# Patient Record
Sex: FEMALE | Race: WHITE | NOT HISPANIC OR LATINO | URBAN - METROPOLITAN AREA
[De-identification: names, ages, dates, MRNs, and addresses within clinical notes are randomized per-mention and may not be internally consistent; named-entity substitution may affect disease eponyms.]

---

## 2017-07-13 ENCOUNTER — ON CAMPUS - OUTPATIENT (AMBULATORY)
Dept: URBAN - METROPOLITAN AREA HOSPITAL 2 | Facility: HOSPITAL | Age: 77
End: 2017-07-13
Payer: COMMERCIAL

## 2017-07-13 ENCOUNTER — OFFICE (AMBULATORY)
Dept: URBAN - METROPOLITAN AREA CLINIC 64 | Facility: CLINIC | Age: 77
End: 2017-07-13

## 2017-07-13 VITALS
SYSTOLIC BLOOD PRESSURE: 132 MMHG | DIASTOLIC BLOOD PRESSURE: 49 MMHG | HEART RATE: 65 BPM | SYSTOLIC BLOOD PRESSURE: 118 MMHG | SYSTOLIC BLOOD PRESSURE: 137 MMHG | OXYGEN SATURATION: 97 % | DIASTOLIC BLOOD PRESSURE: 76 MMHG | HEIGHT: 63 IN | HEART RATE: 57 BPM | SYSTOLIC BLOOD PRESSURE: 128 MMHG | SYSTOLIC BLOOD PRESSURE: 144 MMHG | DIASTOLIC BLOOD PRESSURE: 65 MMHG | SYSTOLIC BLOOD PRESSURE: 123 MMHG | OXYGEN SATURATION: 99 % | DIASTOLIC BLOOD PRESSURE: 63 MMHG | RESPIRATION RATE: 16 BRPM | HEART RATE: 61 BPM | DIASTOLIC BLOOD PRESSURE: 60 MMHG | TEMPERATURE: 98.1 F | HEART RATE: 63 BPM | HEART RATE: 59 BPM | DIASTOLIC BLOOD PRESSURE: 69 MMHG | HEART RATE: 64 BPM | SYSTOLIC BLOOD PRESSURE: 116 MMHG | HEART RATE: 67 BPM | DIASTOLIC BLOOD PRESSURE: 70 MMHG | HEART RATE: 60 BPM | RESPIRATION RATE: 18 BRPM | DIASTOLIC BLOOD PRESSURE: 71 MMHG | OXYGEN SATURATION: 95 % | DIASTOLIC BLOOD PRESSURE: 74 MMHG | HEART RATE: 62 BPM | OXYGEN SATURATION: 98 % | WEIGHT: 170 LBS | SYSTOLIC BLOOD PRESSURE: 117 MMHG

## 2017-07-13 DIAGNOSIS — D12.2 BENIGN NEOPLASM OF ASCENDING COLON: ICD-10-CM

## 2017-07-13 DIAGNOSIS — K57.30 DIVERTICULOSIS OF LARGE INTESTINE WITHOUT PERFORATION OR ABS: ICD-10-CM

## 2017-07-13 DIAGNOSIS — Z86.010 PERSONAL HISTORY OF COLONIC POLYPS: ICD-10-CM

## 2017-07-13 LAB
GI HISTOLOGY: A. UNSPECIFIED: (no result)
GI HISTOLOGY: PDF REPORT: (no result)

## 2017-07-13 PROCEDURE — 88305 TISSUE EXAM BY PATHOLOGIST: CPT | Mod: 26 | Performed by: INTERNAL MEDICINE

## 2017-07-13 PROCEDURE — 45385 COLONOSCOPY W/LESION REMOVAL: CPT | Mod: PT | Performed by: INTERNAL MEDICINE

## 2017-07-13 RX ADMIN — PROPOFOL: 10 INJECTION, EMULSION INTRAVENOUS at 13:02

## 2019-06-14 ENCOUNTER — HOSPITAL ENCOUNTER (OUTPATIENT)
Dept: OTHER | Facility: HOSPITAL | Age: 79
Discharge: HOME | End: 2019-06-14

## 2021-10-20 ENCOUNTER — TRANSCRIBE ORDERS (OUTPATIENT)
Dept: ADMINISTRATIVE | Facility: HOSPITAL | Age: 81
End: 2021-10-20

## 2021-10-20 DIAGNOSIS — Z12.31 VISIT FOR SCREENING MAMMOGRAM: Primary | ICD-10-CM

## 2021-10-27 ENCOUNTER — APPOINTMENT (OUTPATIENT)
Dept: OTHER | Facility: HOSPITAL | Age: 81
End: 2021-10-27

## 2021-10-27 ENCOUNTER — HOSPITAL ENCOUNTER (OUTPATIENT)
Dept: MAMMOGRAPHY | Facility: HOSPITAL | Age: 81
Discharge: HOME | End: 2021-10-27
Admitting: FAMILY MEDICINE

## 2021-10-27 DIAGNOSIS — Z09 FOLLOW UP: ICD-10-CM

## 2021-10-27 DIAGNOSIS — Z12.31 VISIT FOR SCREENING MAMMOGRAM: ICD-10-CM

## 2021-10-27 PROCEDURE — 77067 SCR MAMMO BI INCL CAD: CPT

## 2021-10-27 PROCEDURE — 77063 BREAST TOMOSYNTHESIS BI: CPT

## 2022-04-25 ENCOUNTER — TRANSCRIBE ORDERS (OUTPATIENT)
Dept: ADMINISTRATIVE | Facility: HOSPITAL | Age: 82
End: 2022-04-25

## 2022-04-25 DIAGNOSIS — Z91.89 AT RISK FOR OSTEOPOROSIS: Primary | ICD-10-CM

## 2022-05-18 ENCOUNTER — HOSPITAL ENCOUNTER (OUTPATIENT)
Dept: BONE DENSITY | Facility: HOSPITAL | Age: 82
Discharge: HOME | End: 2022-05-18
Admitting: FAMILY MEDICINE

## 2022-05-18 DIAGNOSIS — Z91.89 AT RISK FOR OSTEOPOROSIS: ICD-10-CM

## 2022-05-18 PROCEDURE — 77080 DXA BONE DENSITY AXIAL: CPT

## 2022-06-21 ENCOUNTER — HOSPITAL ENCOUNTER (EMERGENCY)
Facility: HOSPITAL | Age: 82
Discharge: HOME | End: 2022-06-22
Attending: EMERGENCY MEDICINE | Admitting: EMERGENCY MEDICINE

## 2022-06-21 DIAGNOSIS — S00.83XA CONTUSION OF FOREHEAD, INITIAL ENCOUNTER: ICD-10-CM
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
## 2022-06-21 PROCEDURE — 99283 EMERGENCY DEPT VISIT LOW MDM: CPT

## 2022-06-22 ENCOUNTER — APPOINTMENT (OUTPATIENT)
Dept: CT IMAGING | Facility: HOSPITAL | Age: 82
End: 2022-06-22

## 2022-06-22 VITALS
TEMPERATURE: 97.4 F | OXYGEN SATURATION: 98 % | SYSTOLIC BLOOD PRESSURE: 150 MMHG | HEIGHT: 63 IN | DIASTOLIC BLOOD PRESSURE: 70 MMHG | RESPIRATION RATE: 16 BRPM | BODY MASS INDEX: 27.42 KG/M2 | HEART RATE: 63 BPM | WEIGHT: 154.76 LBS

## 2022-06-22 PROCEDURE — 70450 CT HEAD/BRAIN W/O DYE: CPT

## 2022-06-22 PROCEDURE — 72125 CT NECK SPINE W/O DYE: CPT

## 2022-06-22 NOTE — DISCHARGE INSTRUCTIONS
Return for vomiting altered mental status unequal pupils severe headache dizziness unable to walk numbness tingling weakness or any other new or worse problems or concerns.  Elevate head and ice packs.  Tylenol for pain.

## 2022-06-22 NOTE — ED PROVIDER NOTES
Subjective   Chief complaint fall with head injury    History of present illness 81-year-old female who is coming elaborating this evening when she tripped and fell striking her head on the ground.  No loss of consciousness but patient does have a large hematoma to her forehead and some scraping to her nose.  This happened within the last couple hours moderate throbbing headache nonradiating continuous nothing makes it better or worse.  Denies any neck or back pain she scraped her knees up but she is able to walk afterwards and denies any knee pain.  No vomiting.  No visual disturbances.  No pain in the jaw.  No other complaints or associated symptoms.          Review of Systems   Constitutional: Negative for chills and fever.   Respiratory: Negative for chest tightness and shortness of breath.    Cardiovascular: Negative for chest pain.   Gastrointestinal: Negative for abdominal pain and vomiting.   Musculoskeletal: Negative for back pain and neck pain.   Neurological: Positive for headaches. Negative for dizziness and speech difficulty.   Psychiatric/Behavioral: Negative for agitation and confusion.       No past medical history on file.  Macular degeneration  Allergies   Allergen Reactions   • Codeine Unknown - High Severity   • Metronidazole Angioedema   • Sulfa Antibiotics Anaphylaxis       No past surgical history on file.    No family history on file.    Social History     Socioeconomic History   • Marital status:    Social history no tobacco alcohol or drug  Medications eyedrops and Celexa      Objective   Physical Exam  Constitutional 81-year-old awake alert no acute distress blood pressure 150/70 temperature 97.4 heart rate 63.  HEENT is large hematoma to the right forehead and right supraorbital area no step-off or deformity.  Scrape across anterior nose but no tenderness.  No septal hematomas noted bilaterally.  All extraocular muscles are intact pupils equal round reactive there is no raccoon or  garcia sign.  Midface stable lower jaw stable good occlusion no mandible tenderness.  Back no cervical thoracic or lumbar spine tenderness noted trachea midline chest wall without tenderness good breath sounds bilaterally heart regular without murmur abdomen soft without tenderness no bruising extremities full range of motion arms or legs there is abrasions and bruising to the knees bilaterally but she is able to raise in the straight leg fashion without difficulty she can flex and extend him quadricep patella tendons intact there is stable to stress without anterior posterior drawer.  And the patient has no joint line tenderness and she is able to walk on it without difficulty.  Neurologic awake alert orientated x4 with [place] Coma Scale 15  Procedures           ED Course    No results found for this or any previous visit.  CT Head Without Contrast    Result Date: 6/22/2022  1. No acute intracranial abnormality. 2. Soft tissue swelling over the nose and right frontal scalp. No calvarial fracture. 3. Age-related volume loss and mild chronic small vessel ischemic changes. Electronically signed by:  Jomar Foote  6/21/2022 11:17 PM    CT Cervical Spine Without Contrast    Result Date: 6/22/2022  1. No acute cervical spine fracture is identified. 2. Degenerative changes as detailed above. Electronically signed by:  Edin Minor M.D.  6/21/2022 11:17 PM    Medications - No data to display                                         MDM  Number of Diagnoses or Management Options  Contusion of forehead, initial encounter: new and requires workup  Fall, initial encounter: new and requires workup  Diagnosis management comments: Medical decision making.  Patient had the above exam and evaluation CT head without as well cervical spine no acute fractures noted.  Degenerative changes but no fractures.  No epidural subdural or hemorrhage intracranially some soft tissue swelling.  Some chronic changes noted.  Patient repeat  examination was resting comfortably we talked about the findings.  We talked about head injury precautions she remained awake alert orientated x3 [place] Coma Scale 15.  We talked about what to return for.  She voiced understanding and stable unremarkable ER course.  All CT scans obtained reviewed by me.  Reports of radiology reviewed as well       Amount and/or Complexity of Data Reviewed  Tests in the radiology section of CPT®: reviewed    Risk of Complications, Morbidity, and/or Mortality  Presenting problems: moderate  Diagnostic procedures: moderate  Management options: moderate    Patient Progress  Patient progress: stable      Final diagnoses:   Fall, initial encounter   Contusion of forehead, initial encounter       ED Disposition  ED Disposition     ED Disposition   Discharge    Condition   Stable    Comment   --             Adrian Cox MD  [address] [facility] [place] [address] [phone]    In 1 week           Medication List      No changes were made to your prescriptions during this visit.          Jose Emmanuel MD  06/22/22 0132

## 2022-10-13 ENCOUNTER — ON CAMPUS - OUTPATIENT (AMBULATORY)
Dept: URBAN - METROPOLITAN AREA HOSPITAL 2 | Facility: HOSPITAL | Age: 82
End: 2022-10-13
Payer: MEDICARE

## 2022-10-13 VITALS
DIASTOLIC BLOOD PRESSURE: 78 MMHG | SYSTOLIC BLOOD PRESSURE: 140 MMHG | WEIGHT: 150 LBS | RESPIRATION RATE: 17 BRPM | RESPIRATION RATE: 16 BRPM | SYSTOLIC BLOOD PRESSURE: 127 MMHG | OXYGEN SATURATION: 100 % | HEART RATE: 67 BPM | SYSTOLIC BLOOD PRESSURE: 128 MMHG | SYSTOLIC BLOOD PRESSURE: 126 MMHG | TEMPERATURE: 97.7 F | DIASTOLIC BLOOD PRESSURE: 76 MMHG | DIASTOLIC BLOOD PRESSURE: 61 MMHG | SYSTOLIC BLOOD PRESSURE: 111 MMHG | DIASTOLIC BLOOD PRESSURE: 65 MMHG | OXYGEN SATURATION: 98 % | OXYGEN SATURATION: 95 % | SYSTOLIC BLOOD PRESSURE: 123 MMHG | OXYGEN SATURATION: 99 % | DIASTOLIC BLOOD PRESSURE: 73 MMHG | HEART RATE: 70 BPM | HEART RATE: 83 BPM | SYSTOLIC BLOOD PRESSURE: 144 MMHG | RESPIRATION RATE: 18 BRPM | SYSTOLIC BLOOD PRESSURE: 109 MMHG | HEART RATE: 64 BPM | HEART RATE: 63 BPM | HEIGHT: 63 IN | DIASTOLIC BLOOD PRESSURE: 71 MMHG | DIASTOLIC BLOOD PRESSURE: 80 MMHG

## 2022-10-13 DIAGNOSIS — Z86.010 PERSONAL HISTORY OF COLONIC POLYPS: ICD-10-CM

## 2022-10-13 DIAGNOSIS — K57.30 DIVERTICULOSIS OF LARGE INTESTINE WITHOUT PERFORATION OR ABS: ICD-10-CM

## 2022-10-13 PROCEDURE — G0105 COLORECTAL SCRN; HI RISK IND: HCPCS | Performed by: INTERNAL MEDICINE

## 2024-05-30 ENCOUNTER — OFFICE VISIT (OUTPATIENT)
Dept: ORTHOPEDIC SURGERY | Facility: CLINIC | Age: 84
End: 2024-05-30
Payer: MEDICARE

## 2024-05-30 VITALS — WEIGHT: 147.2 LBS | HEART RATE: 74 BPM | BODY MASS INDEX: 26.08 KG/M2 | HEIGHT: 63 IN

## 2024-05-30 DIAGNOSIS — M19.011 OSTEOARTHRITIS OF RIGHT GLENOHUMERAL JOINT: Primary | ICD-10-CM

## 2024-05-30 RX ORDER — TRIAMCINOLONE ACETONIDE 40 MG/ML
80 INJECTION, SUSPENSION INTRA-ARTICULAR; INTRAMUSCULAR ONCE
Status: COMPLETED | OUTPATIENT
Start: 2024-05-30 | End: 2024-05-30

## 2024-05-30 RX ADMIN — TRIAMCINOLONE ACETONIDE 80 MG: 40 INJECTION, SUSPENSION INTRA-ARTICULAR; INTRAMUSCULAR at 10:55

## 2024-05-30 NOTE — PROGRESS NOTES
"     Patient ID: Nelida Morales is a 83 y.o. female.    Chief Complaint:    Chief Complaint   Patient presents with    Right Shoulder - Initial Evaluation, Pain     Pain 7-8       HPI:  This is an 83-year-old female here with longstanding right shoulder pain worse in the last several months.  There is no trauma to the area.  She has weakness with overhead activity and pain at night despite oral medication  Past Medical History:   Diagnosis Date    Arthritis     Glaucoma        Past Surgical History:   Procedure Laterality Date     SECTION      HAND SURGERY Bilateral     HYSTERECTOMY      JOINT REPLACEMENT Left        History reviewed. No pertinent family history.       Social History     Occupational History    Not on file   Tobacco Use    Smoking status: Never     Passive exposure: Never    Smokeless tobacco: Never   Vaping Use    Vaping status: Never Used   Substance and Sexual Activity    Alcohol use: Never    Drug use: Never    Sexual activity: Defer      Review of Systems   Cardiovascular:  Negative for chest pain.   Musculoskeletal:  Positive for arthralgias.       Objective:    Pulse 74   Ht 160 cm (63\")   Wt 66.8 kg (147 lb 3.2 oz)   BMI 26.08 kg/m²     Physical Examination:  Right shoulder demonstrates intact skin mild pain in the impingement area mild supraspinatus atrophy passive elevation 160 abduction 130 external rotation 40 active elevation demonstrates pseudoparalysis with elevation of 90 degrees intact deltoid function belly press and liftoff are 5/5 and 3/5.  Sensory and motor exam are intact all distributions. Radial pulse is palpable and capillary refill is less than two seconds to all digits.    Imaging:  Prior x-rays demonstrate mild to moderate joint space narrowing near complete loss of the acromiohumeral distance    Assessment:  Right shoulder massive cuff tear    Plan:    Conservative or surgical options discussed she would like to try an injection first, if not improved discussed " reverse shoulder arthroplasty  I recommend injection after today's evaluation. Risks and benefits of the injection were discussed. Under sterile technique and after timeout and verbal consent I injected 80 mg of Kenalog and 2 mL of 1% Lidocaine in the shoulder and subacromial space. It was well tolerated.  See me as needed    Procedures         Disclaimer: Part of this note may be an electronic transcription/translation of spoken language to printed text using the Dragon Dictation System

## 2024-07-10 ENCOUNTER — APPOINTMENT (OUTPATIENT)
Dept: CT IMAGING | Facility: HOSPITAL | Age: 84
End: 2024-07-10
Payer: MEDICARE

## 2024-07-10 ENCOUNTER — HOSPITAL ENCOUNTER (EMERGENCY)
Facility: HOSPITAL | Age: 84
Discharge: HOME | End: 2024-07-10
Payer: MEDICARE

## 2024-07-10 ENCOUNTER — APPOINTMENT (OUTPATIENT)
Dept: GENERAL RADIOLOGY | Facility: HOSPITAL | Age: 84
End: 2024-07-10
Payer: MEDICARE

## 2024-07-10 VITALS
SYSTOLIC BLOOD PRESSURE: 132 MMHG | RESPIRATION RATE: 16 BRPM | HEIGHT: 63 IN | BODY MASS INDEX: 26.09 KG/M2 | OXYGEN SATURATION: 100 % | DIASTOLIC BLOOD PRESSURE: 72 MMHG | WEIGHT: 147.27 LBS | TEMPERATURE: 98.6 F | HEART RATE: 74 BPM

## 2024-07-10 DIAGNOSIS — M25.462 PAIN AND SWELLING OF LEFT KNEE: ICD-10-CM

## 2024-07-10 DIAGNOSIS — S01.81XA LACERATION OF FOREHEAD, INITIAL ENCOUNTER: Primary | ICD-10-CM
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
## 2024-07-10 DIAGNOSIS — S00.93XA CONTUSION OF HEAD, UNSPECIFIED PART OF HEAD, INITIAL ENCOUNTER: ICD-10-CM

## 2024-07-10 DIAGNOSIS — M25.562 PAIN AND SWELLING OF LEFT KNEE: ICD-10-CM

## 2024-07-10 PROCEDURE — 73562 X-RAY EXAM OF KNEE 3: CPT

## 2024-07-10 PROCEDURE — 25010000002 LIDOCAINE 1 % SOLUTION

## 2024-07-10 PROCEDURE — 99284 EMERGENCY DEPT VISIT MOD MDM: CPT

## 2024-07-10 PROCEDURE — 90471 IMMUNIZATION ADMIN: CPT

## 2024-07-10 PROCEDURE — 90715 TDAP VACCINE 7 YRS/> IM: CPT

## 2024-07-10 PROCEDURE — 72125 CT NECK SPINE W/O DYE: CPT

## 2024-07-10 PROCEDURE — 70450 CT HEAD/BRAIN W/O DYE: CPT

## 2024-07-10 PROCEDURE — 25010000002 TETANUS-DIPHTH-ACELL PERTUSSIS 5-2.5-18.5 LF-MCG/0.5 SUSPENSION PREFILLED SYRINGE

## 2024-07-10 RX ORDER — LIDOCAINE HYDROCHLORIDE 10 MG/ML
10 INJECTION, SOLUTION INFILTRATION; PERINEURAL ONCE
Status: COMPLETED | OUTPATIENT
Start: 2024-07-10 | End: 2024-07-10

## 2024-07-10 RX ORDER — DIAPER,BRIEF,INFANT-TODD,DISP
1 EACH MISCELLANEOUS ONCE
Status: COMPLETED | OUTPATIENT
Start: 2024-07-10 | End: 2024-07-10

## 2024-07-10 RX ADMIN — TETANUS TOXOID, REDUCED DIPHTHERIA TOXOID AND ACELLULAR PERTUSSIS VACCINE, ADSORBED 0.5 ML: 5; 2.5; 8; 8; 2.5 SUSPENSION INTRAMUSCULAR at 19:24

## 2024-07-10 RX ADMIN — BACITRACIN 0.9 G: 500 OINTMENT TOPICAL at 21:08

## 2024-07-10 RX ADMIN — LIDOCAINE HYDROCHLORIDE 10 ML: 10 INJECTION, SOLUTION INFILTRATION; PERINEURAL at 20:28

## 2024-07-10 NOTE — ED PROVIDER NOTES
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  Due to significant overcrowding in the emergency department patient was initially seen and evaluated in triage.  Provider in triage recommended patient placement in the treatment area to initiate therapy and movement to an ER bed as soon as possible.      History of Present Illness  I have read the above Pit note and attest that this is the HPI    PCP: Kenny        Review of Systems   Skin:  Positive for wound.       Past Medical History:   Diagnosis Date    Arthritis     Glaucoma        Allergies   Allergen Reactions    Codeine Unknown - High Severity    Metronidazole Angioedema    Sulfa Antibiotics Anaphylaxis       Past Surgical History:   Procedure Laterality Date     SECTION      HAND SURGERY Bilateral     HYSTERECTOMY      JOINT REPLACEMENT Left        No family history on file.    Social History     Socioeconomic History    Marital status:    Tobacco Use    Smoking status: Never     Passive exposure: Never    Smokeless tobacco: Never   Vaping Use    Vaping status: Never Used   Substance and Sexual Activity    Alcohol use: Never    Drug use: Never    Sexual activity: Defer           Objective   Physical Exam  Vitals and nursing note reviewed.   Constitutional:       Appearance: Normal appearance.   Eyes:      Extraocular Movements: Extraocular movements intact.      Conjunctiva/sclera: Conjunctivae normal.      Pupils: Pupils are equal, round, and reactive to light.   Cardiovascular:      Rate and Rhythm: Normal rate  and regular rhythm.      Pulses: Normal pulses.      Heart sounds: Normal heart sounds.   Pulmonary:      Effort: Pulmonary effort is normal.      Breath sounds: Normal breath sounds.   Musculoskeletal:      Cervical back: Normal range of motion.      Comments: Patient is able to move all extremities appropriately.  There is mild swelling noted to the left knee along with very mild redness however does not appear to be cellulitic as patient had a fall last week on this knee.  Does appear to be in healing.  Very mild tenderness to palpation, no obvious deformities.   Skin:     General: Skin is warm.      Capillary Refill: Capillary refill takes less than 2 seconds.      Comments: There is approximately 3 cm laceration to the right forehead along with bruising and swelling to this area.    There is a contusion noted to the right chin and the right anterior lip without any jaw or teeth involvement    There is mild redness noted to the left knee, no cellulitis present or open wounds.  She does have scattered bruising to the right knee left knee and left shin from fall last week   Neurological:      Mental Status: She is alert and oriented to person, place, and time.   Psychiatric:         Mood and Affect: Mood normal.         Behavior: Behavior normal.         Thought Content: Thought content normal.         Judgment: Judgment normal.         Laceration Repair    Date/Time: 7/10/2024 8:47 PM    Performed by: Lito Bradley APRN  Authorized by: Lito Bradley APRN    Consent:     Consent obtained:  Verbal    Consent given by:  Patient    Risks, benefits, and alternatives were discussed: yes      Risks discussed:  Infection, pain, poor cosmetic result, need for additional repair and poor wound healing    Alternatives discussed:  No treatment and delayed treatment  Universal protocol:     Procedure explained and questions answered to patient or proxy's satisfaction: yes      Imaging studies available: yes      Required  "blood products, implants, devices, and special equipment available: no      Site/side marked: yes      Immediately prior to procedure, a time out was called: yes      Patient identity confirmed:  Verbally with patient and arm band  Anesthesia:     Anesthesia method:  Local infiltration    Local anesthetic:  Lidocaine 1% w/o epi  Laceration details:     Location:  Face    Face location:  Forehead    Length (cm):  3    Depth (mm):  0.3  Pre-procedure details:     Preparation:  Patient was prepped and draped in usual sterile fashion  Exploration:     Limited defect created (wound extended): no      Imaging outcome: foreign body not noted      Wound exploration: wound explored through full range of motion and entire depth of wound visualized      Wound extent: no foreign bodies/material noted, no muscle damage noted, no tendon damage noted and no vascular damage noted      Contaminated: no    Treatment:     Area cleansed with:  Chlorhexidine and saline    Amount of cleaning:  Standard    Irrigation solution:  Sterile saline    Irrigation method:  Pressure wash    Debridement:  None    Undermining:  None  Skin repair:     Repair method:  Sutures    Suture size:  5-0    Suture material:  Nylon    Suture technique:  Simple interrupted    Number of sutures:  4  Approximation:     Approximation:  Close  Repair type:     Repair type:  Simple  Post-procedure details:     Dressing:  Antibiotic ointment and non-adherent dressing    Procedure completion:  Tolerated well, no immediate complications             ED Course  ED Course as of 07/10/24 2115   Wed Jul 10, 2024   1725 Marked ready for CT [KW]      ED Course User Index  [KW] Erica Pompa, DAWNA    /72 (BP Location: Right arm, Patient Position: Lying)   Pulse 74   Temp 98.6 °F (37 °C) (Oral)   Resp 16   Ht 160 cm (63\")   Wt 66.8 kg (147 lb 4.3 oz)   SpO2 100%   BMI 26.09 kg/m²   Labs Reviewed - No data to display  Medications   Tetanus-Diphth-Acell " Pertussis (BOOSTRIX) injection 0.5 mL (0.5 mL Intramuscular Given 7/10/24 1924)   lidocaine (XYLOCAINE) 1 % injection 10 mL (10 mL Injection Given 7/10/24 2028)   bacitracin ointment 0.9 g (0.9 g Topical Given 7/10/24 2108)     XR Knee 3 View Left    Result Date: 7/10/2024  Impression: No acute osseous abnormality. Nonspecific heterogeneous lesion within the distal aspect of the femur, nonspecific but most likely represents an enchondroma. Electronically Signed: Craig Gray,   7/10/2024 7:46 PM EDT  Workstation ID: [id number]    CT Head Without Contrast    Result Date: 7/10/2024  Impression: No acute intracranial or cervical spine abnormality. Small right frontal scalp hematoma/contusion with overlying laceration Electronically Signed: Craig Gray,   7/10/2024 5:52 PM EDT  Workstation ID: [id number]    CT Cervical Spine Without Contrast    Result Date: 7/10/2024  Impression: No acute intracranial or cervical spine abnormality. Small right frontal scalp hematoma/contusion with overlying laceration Electronically Signed: Craig Gray,   7/10/2024 5:52 PM EDT  Workstation ID: [id number]                                            Medical Decision Making  Patient was seen for the above complaints.  Lab work was considered but not felt to be emergently warranted at this time as patient had no loss of consciousness or syncopal episode.  Head CT and C-spine were obtained due to patient hitting her head, this was independently interpreted by the radiologist as no acute intracranial or cervical spine abnormality, small right frontal scalp hematoma/contusion with overlying laceration.  Patient has a 3 cm laceration to the right forehead from the fall along with a contusion to the right chin.  She is able to open her mouth completely, there is no teeth involvement, and no open wounds of the chin or inside the mouth.  The laceration was repaired, please see the laceration note.  The laceration was cleaned per  the nursing staff, 4 sutures were placed per myself, bacitracin was then applied with a nonadherent dressing per the nursing staff.  The patient's tetanus shot was updated today.  On initial assessment, patient has scattered bruising to the left lower extremity and the right lower extremity from a fall last week.  Her left knee was slightly swollen along with mild erythema.  Does not look cellulitic, appears to be healing at this time the patient denies any pain with ambulation flexion or extension.  X-ray of the knee was obtained and independently interpreted by the radiologist as no osseous abnormality, nonspecific heterogenous lesion within the distal aspect of the femur nonspecific but most likely represents an enchondroma.  Discussed x-ray and knee presentation with Dr. Chirinos who agreed with outpatient management with orthopedics f/u.  Patient will be discharged with instructions to follow-up with orthopedics along with laceration repair, watch for signs and symptoms of infection of the laceration or knee, head injury precautions, and follow-up with primary care.  Instructed patient to get the sutures removed in 7 to 10 days.  Patient and son at bedside verbalized understanding were agreeable disposition at this time.    I discussed findings with patient who voices understanding of discharge instructions, signs and symptoms requiring return to ED; discharged improved and in stable condition with follow up for re-evaluation.  This document is intended for medical expert use only. Reading of this document by patients and/or patient's family without participating medical staff guidance may result in misinterpretation and unintended morbidity.  Any interpretation of such data is the responsibility of the patient and/or family member responsible for the patient in concert with their primary or specialist providers, not to be left for sources of online searches such as [employer], [employer] or similar queries. Relying on  these approaches to knowledge may result in misinterpretation, misguided goals of care and even death should patients or family members try recommendations outside of the realm of professional medical care in a supervised inpatient environment.     Problems Addressed:  Contusion of head, unspecified part of head, initial encounter: complicated acute illness or injury  Fall, initial encounter: complicated acute illness or injury  Laceration of forehead, initial encounter: complicated acute illness or injury  Pain and swelling of left knee: complicated acute illness or injury    Amount and/or Complexity of Data Reviewed  Radiology: ordered and independent interpretation performed. Decision-making details documented in ED Course.    Risk  OTC drugs.  Prescription drug management.        Final diagnoses:   Laceration of forehead, initial encounter   Fall, initial encounter   Pain and swelling of left knee   Contusion of head, unspecified part of head, initial encounter       ED Disposition  ED Disposition       ED Disposition   Discharge    Condition   Stable    Comment   --               Adrian Cox MD  [address] [place] [address] [place] [id number] [phone]    Schedule an appointment as soon as possible for a visit       Adilson Dewey MD  [address] [place] [address] [place] [id number] [phone]    Schedule an appointment as soon as possible for a visit            Medication List      No changes were made to your prescriptions during this visit.            Lito Bradley, APRN  07/10/24 7718

## 2024-07-11 NOTE — DISCHARGE INSTRUCTIONS
Continue to keep the wound clean dry and covered.  Watch for signs symptoms of infection such as abnormal discharge or increased redness to the area that is streaking.  Have the sutures removed in 7 to 10 days.  Also continue to watch the left knee for any signs and symptoms of infection such as increased redness or abnormal pain    Follow-up with primary care and orthopedics    Return with any new or worsening symptoms.      Follow head injury precautions, watching for signs and symptoms of altered mental status confusion unilateral weakness changes in vision etc.

## 2024-07-12 ENCOUNTER — OFFICE VISIT (OUTPATIENT)
Dept: ORTHOPEDIC SURGERY | Facility: CLINIC | Age: 84
End: 2024-07-12
Payer: MEDICARE

## 2024-07-12 VITALS — BODY MASS INDEX: 26.05 KG/M2 | HEIGHT: 63 IN | OXYGEN SATURATION: 97 % | WEIGHT: 147 LBS | RESPIRATION RATE: 20 BRPM

## 2024-07-12 DIAGNOSIS — S80.02XA CONTUSION OF LEFT KNEE, INITIAL ENCOUNTER: Primary | ICD-10-CM

## 2024-07-12 RX ORDER — MELOXICAM 15 MG/1
15 TABLET ORAL DAILY
Qty: 14 TABLET | Refills: 0 | Status: SHIPPED | OUTPATIENT
Start: 2024-07-12 | End: 2024-07-26

## 2024-07-12 NOTE — PROGRESS NOTES
[facility]        Patient Name: Nelida Morales  : 1940  Primary Care Physician: Adrian Cox MD        Chief Complaint:    Chief Complaint   Patient presents with    Left Knee - Pain, Initial Evaluation     DOI- 7/10/2024  Pt fell at her [employer]           HPI:   Nelida Morales is a 83 y.o. year old who presents today for evaluation of left knee pain.    She fell x 2 over the last 2 weeks, hitting the left knee. She presented to the ED with facial lacerations. Xray imaging of the knee was obtained and ultimately negative.     Today, she denies knee pain. She has no difficulty with weight-bearing. She has full strength and ROM. She has mild effusion and some bruising to the left knee. Denies instability.         Past Medical/Surgical, Social and Family History:  I have reviewed and/or updated pertinent history as noted in the medical record including:  Past Medical History:   Diagnosis Date    Arthritis     Glaucoma      Past Surgical History:   Procedure Laterality Date     SECTION      HAND SURGERY Bilateral     HYSTERECTOMY      JOINT REPLACEMENT Left      Social History     Occupational History    Not on file   Tobacco Use    Smoking status: Never     Passive exposure: Never    Smokeless tobacco: Never   Vaping Use    Vaping status: Never Used   Substance and Sexual Activity    Alcohol use: Never    Drug use: Never    Sexual activity: Defer          Allergies:   Allergies   Allergen Reactions    Codeine Unknown - High Severity    Metronidazole Angioedema    Sulfa Antibiotics Anaphylaxis       Medications:   Home Medications:  Current Outpatient Medications on File Prior to Visit   Medication Sig    citalopram (CeleXA) 20 MG tablet Take 1 tablet by mouth Daily.    timolol (TIMOPTIC) 0.5 % ophthalmic solution USE ONE (1) DROP IN THE RIGHT EYE TWICE DAILY AS DIRECTED    travoprost, ROBBIN free, (TRAVATAN) 0.004 % solution ophthalmic solution PLACE ONE (1) DROP IN EACH EYE EVERY NIGHT AT BEDTIME     No  current facility-administered medications on file prior to visit.         ROS:  Negative unless listed in the HPI    Physical Exam:   83 y.o. female  Body mass index is 26.04 kg/m²., 66.7 kg (147 lb)  Vitals:    07/12/24 0901   Resp: 20   SpO2: 97%     General: Alert, cooperative, appears well and in no observable distress.   HEENT: Normocephalic, atraumatic on external visual inspection. No icterus.   CV: No significant peripheral edema.    Respiratory: Normal respiratory effort.   Skin: Warm & well perfused; appropriate skin turgor.  Psych: Appropriate mood & affect.  Neuro: Gross sensation and motor intact in affected extremity/extremities.  Vascular: Peripheral pulses palpable in affected extremity/extremities.     Ortho Exam   Left knee  No acute deformity  Mild bruising with small effusion  Scant warmth to the knee. No erythema, streaking  Mild tenderness with palpation over patella, otherwise, no tenderness  ROM 0-130    Radiology:  Narrative & Impression   XR KNEE 3 VW LEFT     Date of Exam: 7/10/2024 7:32 PM EDT     Indication: fall last week, swelling, redness     Comparison: None available.     Findings:  No acute fracture or dislocation.  No suspicious erosive changes.  Mild tricompartment osteoarthritis.  Nonspecific heterogeneous lesion within the distal diaphysis of the femur.     No significant joint effusion.  The visualized soft tissues are unremarkable.     IMPRESSION:  Impression:  No acute osseous abnormality.     Nonspecific heterogeneous lesion within the distal aspect of the femur, nonspecific but most likely represents an enchondroma.        Electronically Signed: Craig Gray DO    7/10/2024 7:46 PM EDT    Workstation ID: [id number]       Assessment:  Left knee contusion  Body mass index is 26.04 kg/m².  BMI consistent with Overweight: 25.0-29.9kg/m2            Plan:  Meloxicam 15 mg po daily x 14 days prescribed  WBAT  No s/s to warrant additional imaging  If warmth progresses or  becomes febrile, please present to ED - likely inflammatory response r/t fall/contusion  Follow up PRN (she has a routine f/u with Dr. Dewey in 2 weeks for the right shoulder; if knee not improved, please notify office and happy to see her)  Patient encouraged to call with any questions or concerns in the interim    Niesha Bueno, APRN

## 2024-08-05 ENCOUNTER — OFFICE VISIT (OUTPATIENT)
Dept: ORTHOPEDIC SURGERY | Facility: CLINIC | Age: 84
End: 2024-08-05
Payer: MEDICARE

## 2024-08-05 ENCOUNTER — PREP FOR SURGERY (OUTPATIENT)
Dept: OTHER | Facility: HOSPITAL | Age: 84
End: 2024-08-05
Payer: MEDICARE

## 2024-08-05 VITALS — BODY MASS INDEX: 26.05 KG/M2 | WEIGHT: 147 LBS | HEIGHT: 63 IN | HEART RATE: 72 BPM

## 2024-08-05 DIAGNOSIS — R94.5 ABNORMAL RESULTS OF LIVER FUNCTION STUDIES: ICD-10-CM

## 2024-08-05 DIAGNOSIS — M19.011 OSTEOARTHRITIS OF RIGHT GLENOHUMERAL JOINT: Primary | ICD-10-CM

## 2024-08-05 DIAGNOSIS — R79.1 ABNORMAL COAGULATION PROFILE: ICD-10-CM

## 2024-08-05 DIAGNOSIS — R94.120 ABNORMAL AUDITORY FUNCTION STUDY: ICD-10-CM

## 2024-08-05 DIAGNOSIS — R79.9 ABNORMAL FINDING OF BLOOD CHEMISTRY, UNSPECIFIED: ICD-10-CM

## 2024-08-05 PROCEDURE — 99214 OFFICE O/P EST MOD 30 MIN: CPT | Performed by: ORTHOPAEDIC SURGERY

## 2024-08-05 PROCEDURE — 97760 ORTHOTIC MGMT&TRAING 1ST ENC: CPT | Performed by: ORTHOPAEDIC SURGERY

## 2024-08-05 RX ORDER — OXYCODONE HCL 10 MG/1
10 TABLET, FILM COATED, EXTENDED RELEASE ORAL ONCE
OUTPATIENT
Start: 2024-08-05 | End: 2024-08-05

## 2024-08-05 RX ORDER — PREGABALIN 75 MG/1
150 CAPSULE ORAL ONCE
OUTPATIENT
Start: 2024-08-05 | End: 2024-08-05

## 2024-08-05 RX ORDER — MELOXICAM 15 MG/1
15 TABLET ORAL ONCE
OUTPATIENT
Start: 2024-08-05 | End: 2024-08-05

## 2024-08-05 RX ORDER — TRANEXAMIC ACID 10 MG/ML
1000 INJECTION, SOLUTION INTRAVENOUS ONCE
OUTPATIENT
Start: 2024-08-05 | End: 2024-08-05

## 2024-08-05 NOTE — PROGRESS NOTES
"     Patient ID: Nelida Morales is a 83 y.o. female.    Right shoulder pain  Returns after injection in May without much improvement  Review of Systems:        Objective:    Pulse 72   Ht 160 cm (63\")   Wt 66.7 kg (147 lb)   BMI 26.04 kg/m²     Physical Examination:   Right shoulder demonstrates intact skin mild pain in the impingement area mild supraspinatus atrophy passive elevation 160 abduction 130 external rotation 40 active elevation demonstrates pseudoparalysis with elevation of 90 degrees intact deltoid function belly press and liftoff are 5/5 and 3/5.  Sensory and motor exam are intact all distributions. Radial pulse is palpable and capillary refill is less than two seconds to all digits.       Imaging:       Assessment:    Right shoulder massive cuff tear    Plan:   Further options discussed she is ready proceed with right reverse total shoulder arthroplasty  Discussed the risks including bleeding, scar, infection, stiffness, nerve, artery, vein and tendon damage, instability, fracture, DVT, loss of life or limb. Issues of scapular notching and component revision were discussed. Questions were answered and addressed.  Obtain preop CT scan, postop sling dispensed  Greater than 15 minutes was spent demonstrating proper fit and use of the device and signs to monitor for complications      Procedures          Disclaimer: Part of this note may be an electronic transcription/translation of spoken language to printed text using the Dragon Dictation System  "

## 2024-08-12 PROBLEM — M19.011 OSTEOARTHRITIS OF RIGHT GLENOHUMERAL JOINT: Status: ACTIVE | Noted: 2024-08-05

## 2024-09-10 ENCOUNTER — HOSPITAL ENCOUNTER (OUTPATIENT)
Dept: CT IMAGING | Facility: HOSPITAL | Age: 84
Discharge: HOME | End: 2024-09-10
Admitting: ORTHOPAEDIC SURGERY
Payer: MEDICARE

## 2024-09-10 DIAGNOSIS — M19.011 OSTEOARTHRITIS OF RIGHT GLENOHUMERAL JOINT: ICD-10-CM

## 2024-09-10 PROCEDURE — 73200 CT UPPER EXTREMITY W/O DYE: CPT

## 2024-09-27 ENCOUNTER — HOSPITAL ENCOUNTER (OUTPATIENT)
Dept: CARDIOLOGY | Facility: HOSPITAL | Age: 84
Setting detail: HOSPITAL OUTPATIENT SURGERY
Discharge: HOME | End: 2024-09-27
Payer: MEDICARE

## 2024-09-27 ENCOUNTER — PRE-ADMISSION TESTING (OUTPATIENT)
Dept: PREADMISSION TESTING | Facility: HOSPITAL | Age: 84
End: 2024-09-27
Payer: MEDICARE

## 2024-09-27 ENCOUNTER — LAB (OUTPATIENT)
Dept: LAB | Facility: HOSPITAL | Age: 84
End: 2024-09-27
Payer: MEDICARE

## 2024-09-27 VITALS
HEIGHT: 62 IN | OXYGEN SATURATION: 98 % | SYSTOLIC BLOOD PRESSURE: 145 MMHG | WEIGHT: 141.3 LBS | TEMPERATURE: 98 F | HEART RATE: 67 BPM | RESPIRATION RATE: 18 BRPM | BODY MASS INDEX: 26 KG/M2 | DIASTOLIC BLOOD PRESSURE: 81 MMHG

## 2024-09-27 DIAGNOSIS — R94.5 ABNORMAL RESULTS OF LIVER FUNCTION STUDIES: ICD-10-CM

## 2024-09-27 DIAGNOSIS — R79.9 ABNORMAL FINDING OF BLOOD CHEMISTRY, UNSPECIFIED: ICD-10-CM

## 2024-09-27 DIAGNOSIS — R79.1 ABNORMAL COAGULATION PROFILE: ICD-10-CM

## 2024-09-27 DIAGNOSIS — R94.120 ABNORMAL AUDITORY FUNCTION STUDY: ICD-10-CM

## 2024-09-27 DIAGNOSIS — M19.011 OSTEOARTHRITIS OF RIGHT GLENOHUMERAL JOINT: ICD-10-CM

## 2024-09-27 LAB
ABO GROUP BLD: NORMAL
ANION GAP SERPL CALCULATED.3IONS-SCNC: 7.5 MMOL/L (ref 5–15)
APTT PPP: 25.6 SECONDS (ref 24–31)
BASOPHILS # BLD AUTO: 0.02 10*3/MM3 (ref 0–0.2)
BASOPHILS NFR BLD AUTO: 0.3 % (ref 0–1.5)
BLD GP AB SCN SERPL QL: NEGATIVE
BUN SERPL-MCNC: 19 MG/DL (ref 8–23)
BUN/CREAT SERPL: 27.9 (ref 7–25)
CALCIUM SPEC-SCNC: 9.6 MG/DL (ref 8.6–10.5)
CHLORIDE SERPL-SCNC: 103 MMOL/L (ref 98–107)
CO2 SERPL-SCNC: 28.5 MMOL/L (ref 22–29)
CREAT SERPL-MCNC: 0.68 MG/DL (ref 0.57–1)
DEPRECATED RDW RBC AUTO: 48 FL (ref 37–54)
EGFRCR SERPLBLD CKD-EPI 2021: 87.1 ML/MIN/1.73
EOSINOPHIL # BLD AUTO: 0.21 10*3/MM3 (ref 0–0.4)
EOSINOPHIL NFR BLD AUTO: 3.3 % (ref 0.3–6.2)
ERYTHROCYTE [DISTWIDTH] IN BLOOD BY AUTOMATED COUNT: 13.8 % (ref 12.3–15.4)
GLUCOSE SERPL-MCNC: 88 MG/DL (ref 65–99)
HBA1C MFR BLD: 5.6 % (ref 4.8–5.6)
HCT VFR BLD AUTO: 40.6 % (ref 34–46.6)
HGB BLD-MCNC: 12.7 G/DL (ref 12–15.9)
IMM GRANULOCYTES # BLD AUTO: 0.02 10*3/MM3 (ref 0–0.05)
IMM GRANULOCYTES NFR BLD AUTO: 0.3 % (ref 0–0.5)
INR PPP: 0.93 (ref 0.93–1.1)
LYMPHOCYTES # BLD AUTO: 1.72 10*3/MM3 (ref 0.7–3.1)
LYMPHOCYTES NFR BLD AUTO: 27.3 % (ref 19.6–45.3)
MCH RBC QN AUTO: 29.9 PG (ref 26.6–33)
MCHC RBC AUTO-ENTMCNC: 31.3 G/DL (ref 31.5–35.7)
MCV RBC AUTO: 95.5 FL (ref 79–97)
MONOCYTES # BLD AUTO: 0.54 10*3/MM3 (ref 0.1–0.9)
MONOCYTES NFR BLD AUTO: 8.6 % (ref 5–12)
MRSA DNA SPEC QL NAA+PROBE: NORMAL
NEUTROPHILS NFR BLD AUTO: 3.78 10*3/MM3 (ref 1.7–7)
NEUTROPHILS NFR BLD AUTO: 60.2 % (ref 42.7–76)
NRBC BLD AUTO-RTO: 0 /100 WBC (ref 0–0.2)
PLATELET # BLD AUTO: 231 10*3/MM3 (ref 140–450)
PMV BLD AUTO: 10.8 FL (ref 6–12)
POTASSIUM SERPL-SCNC: 4.5 MMOL/L (ref 3.5–5.2)
PROTHROMBIN TIME: 10.2 SECONDS (ref 9.6–11.7)
RBC # BLD AUTO: 4.25 10*6/MM3 (ref 3.77–5.28)
RH BLD: POSITIVE
SODIUM SERPL-SCNC: 139 MMOL/L (ref 136–145)
T&S EXPIRATION DATE: NORMAL
WBC NRBC COR # BLD AUTO: 6.29 10*3/MM3 (ref 3.4–10.8)

## 2024-09-27 PROCEDURE — 85025 COMPLETE CBC W/AUTO DIFF WBC: CPT

## 2024-09-27 PROCEDURE — 87641 MR-STAPH DNA AMP PROBE: CPT

## 2024-09-27 PROCEDURE — 83036 HEMOGLOBIN GLYCOSYLATED A1C: CPT

## 2024-09-27 PROCEDURE — 93005 ELECTROCARDIOGRAM TRACING: CPT | Performed by: ORTHOPAEDIC SURGERY

## 2024-09-27 PROCEDURE — 80048 BASIC METABOLIC PNL TOTAL CA: CPT

## 2024-09-27 PROCEDURE — 86900 BLOOD TYPING SEROLOGIC ABO: CPT

## 2024-09-27 PROCEDURE — 86901 BLOOD TYPING SEROLOGIC RH(D): CPT

## 2024-09-27 PROCEDURE — 36415 COLL VENOUS BLD VENIPUNCTURE: CPT

## 2024-09-27 PROCEDURE — 85610 PROTHROMBIN TIME: CPT

## 2024-09-27 PROCEDURE — 85730 THROMBOPLASTIN TIME PARTIAL: CPT

## 2024-09-27 PROCEDURE — 86850 RBC ANTIBODY SCREEN: CPT

## 2024-09-28 LAB
QT INTERVAL: 404 MS
QTC INTERVAL: 424 MS

## 2024-10-07 ENCOUNTER — OFFICE VISIT (OUTPATIENT)
Dept: ORTHOPEDIC SURGERY | Facility: CLINIC | Age: 84
End: 2024-10-07
Payer: MEDICARE

## 2024-10-07 VITALS — WEIGHT: 141 LBS | HEIGHT: 62 IN | HEART RATE: 72 BPM | BODY MASS INDEX: 25.95 KG/M2 | OXYGEN SATURATION: 99 %

## 2024-10-07 DIAGNOSIS — M19.011 OSTEOARTHRITIS OF RIGHT GLENOHUMERAL JOINT: Primary | ICD-10-CM

## 2024-10-07 PROCEDURE — 20610 DRAIN/INJ JOINT/BURSA W/O US: CPT | Performed by: ORTHOPAEDIC SURGERY

## 2024-10-07 RX ORDER — TRIAMCINOLONE ACETONIDE 40 MG/ML
80 INJECTION, SUSPENSION INTRA-ARTICULAR; INTRAMUSCULAR ONCE
Status: DISCONTINUED | OUTPATIENT
Start: 2024-10-07 | End: 2024-10-07

## 2024-10-07 RX ORDER — TRIAMCINOLONE ACETONIDE 40 MG/ML
80 INJECTION, SUSPENSION INTRA-ARTICULAR; INTRAMUSCULAR ONCE
Status: COMPLETED | OUTPATIENT
Start: 2024-10-07 | End: 2024-10-07

## 2024-10-07 RX ADMIN — TRIAMCINOLONE ACETONIDE 80 MG: 40 INJECTION, SUSPENSION INTRA-ARTICULAR; INTRAMUSCULAR at 09:44

## 2024-10-07 NOTE — PROGRESS NOTES
"     Patient ID: Nelida Morales is a 84 y.o. female.  Right shoulder pain  Returns with right shoulder pain secondary to massive cuff tear request for surgery was denied    Review of Systems:        Objective:    Ht 156.2 cm (61.5\")   Wt 64 kg (141 lb)   BMI 26.21 kg/m²     Physical Examination:   Right shoulder demonstrates intact skin mild pain in the impingement area mild supraspinatus atrophy passive elevation 160 abduction 130 external rotation 40 active elevation demonstrates pseudoparalysis with elevation of 90 degrees intact deltoid function belly press and liftoff are 5/5 and 3/5.  Sensory and motor exam are intact all distributions. 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 Risks and benefits of the injection were discussed. Under sterile technique and after timeout and verbal consent I injected 80 mg of Kenalog and 2 mL of 1% Lidocaine in the shoulder and subacromial space. It was well tolerated.      Procedures          Disclaimer: Part of this note may be an electronic transcription/translation of spoken language to printed text using the Dragon Dictation System  "

## 2024-11-01 ENCOUNTER — TELEPHONE (OUTPATIENT)
Dept: ORTHOPEDIC SURGERY | Facility: CLINIC | Age: 84
End: 2024-11-01
Payer: MEDICARE

## 2024-11-01 NOTE — TELEPHONE ENCOUNTER
Beatriz will call at a later date.  Dr Garvin is a radiologist.  She will have to call facility.

## 2024-11-01 NOTE — TELEPHONE ENCOUNTER
Caller: Nelida Morales    Relationship: Self    Best call back number: [phone]    What was the call regarding: PT STATES SHE RECENTLY RECEIVED AN EXPLANATION OF BENEFITS FROM HER [employer] THAT SHOWED A CT SCAN OF R ARM WITH A PROVIDER OF SAUL MCCAULEY NOTED. PLEASE CONTACT PT TO DISCUSS THIS VISIT AND THE BILLING CHARGE.

## 2025-03-06 ENCOUNTER — OFFICE VISIT (OUTPATIENT)
Dept: ORTHOPEDIC SURGERY | Facility: CLINIC | Age: 85
End: 2025-03-06
Payer: MEDICARE

## 2025-03-06 VITALS — HEIGHT: 62 IN | HEART RATE: 68 BPM | BODY MASS INDEX: 25.95 KG/M2 | WEIGHT: 141 LBS

## 2025-03-06 DIAGNOSIS — M19.011 OSTEOARTHRITIS OF RIGHT GLENOHUMERAL JOINT: Primary | ICD-10-CM

## 2025-03-06 PROCEDURE — 99213 OFFICE O/P EST LOW 20 MIN: CPT | Performed by: ORTHOPAEDIC SURGERY

## 2025-03-06 NOTE — PROGRESS NOTES
"     Patient ID: Nelida Morales is a 84 y.o. female.    Right shoulder pain  Steroid injection in October has helped greatly with pain relief however no functional improvement  Review of Systems:        Objective:    Pulse 68   Ht 156.2 cm (61.5\")   Wt 64 kg (141 lb)   BMI 26.21 kg/m²     Physical Examination:      Right shoulder demonstrates intact skin mild pain in the impingement area mild supraspinatus atrophy passive elevation 160 abduction 130 external rotation 40 active elevation demonstrates pseudoparalysis with elevation of 90 degrees intact deltoid function belly press and liftoff are 5/5 and 3/5.  Sensory and motor exam are intact all distributions. 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

## 2025-03-11 ENCOUNTER — TREATMENT (OUTPATIENT)
Dept: PHYSICAL THERAPY | Facility: CLINIC | Age: 85
End: 2025-03-11
Payer: MEDICARE

## 2025-03-11 DIAGNOSIS — M25.511 CHRONIC RIGHT SHOULDER PAIN: ICD-10-CM

## 2025-03-11 DIAGNOSIS — M19.011 OSTEOARTHRITIS OF RIGHT GLENOHUMERAL JOINT: Primary | ICD-10-CM

## 2025-03-11 DIAGNOSIS — G89.29 CHRONIC RIGHT SHOULDER PAIN: ICD-10-CM

## 2025-03-11 DIAGNOSIS — R29.898 WEAKNESS OF RIGHT SHOULDER: ICD-10-CM

## 2025-03-11 DIAGNOSIS — M25.611 DECREASED RIGHT SHOULDER RANGE OF MOTION: ICD-10-CM

## 2025-03-11 PROCEDURE — 97110 THERAPEUTIC EXERCISES: CPT | Performed by: PHYSICAL THERAPIST

## 2025-03-11 PROCEDURE — 97161 PT EVAL LOW COMPLEX 20 MIN: CPT | Performed by: PHYSICAL THERAPIST

## 2025-03-11 NOTE — PROGRESS NOTES
Physical Therapy Initial Evaluation and Plan of Care    [facility]  [address]  [place] [address]    Patient: Nelida Morales   : 1940  Referring practitioner: Adilson Dewey, *  Date of Initial Visit: 3/11/2025  Today's Date: 3/11/2025  Patient seen for 1 sessions    Diagnosis/ICD-10 Codes:    ICD-10-CM ICD-9-CM   1. Osteoarthritis of right glenohumeral joint  M19.011 715.91   2. Chronic right shoulder pain  M25.511 719.41    G89.29 338.29   3. Weakness of right shoulder  R29.898 781.99   4. Decreased right shoulder range of motion  M25.611 719.51     Past Medical History:   Diagnosis Date    Arthritis     Glaucoma      Past Surgical History:   Procedure Laterality Date     SECTION      HAND SURGERY Bilateral     HYSTERECTOMY      JOINT REPLACEMENT Left           Subjective Questionnaire: QuickDASH: 59% impairment      Subjective Evaluation    History of Present Illness  Date of onset: 3/6/2025  Mechanism of injury: Nelida Morales is an 84 y.o.female who reports to clinic today with current complaints of inability to reach out, up and back with her right arm and pain associated with those motions.  She reports her issues started about a year ago of insidious onset and the pain and decrease in motion have gotten progressively worse over time. She did have two injections in that helped with pain, but did not help with motion.  Since then her shoulder pain has gradually returned.      Pain  Current pain ratin  At best pain ratin  At worst pain ratin  Location: right anterior shoulder  Quality: dull ache (more sharp when raising arm)  Relieving factors: rest (topical cream)  Exacerbated by: reaching up, reachout out, reaching back, lying on right shoulder.  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 [employer], be able to do gardening           Objective          Cervical/Thoracic Screen   Cervical range of motion within normal limits  Cervical range of motion within normal limits with the following exceptions: Some discomfort with right rotation and left side bending  Thoracic range of motion within normal limits    Active Range of Motion   Left Shoulder   Flexion: 170 degrees   Abduction: 170 degrees   External rotation 90°: 90 degrees   Internal rotation 90°: 70 degrees     Right Shoulder   Flexion: 70 degrees   Abduction: 55 degrees   External rotation 90°: 50 degrees  Internal rotation 90°: 30 degrees     Passive Range of Motion     Right Shoulder   Flexion: 160 degrees   Abduction: 160 degrees   External rotation 90°: 70 degrees   Internal rotation 90°: 40 degrees     Scapular Mobility     Right Shoulder   Scapular mobility: fair  Scapular Mobility with Shoulder to 90° FF   Upward rotation: inadequate    Strength/Myotome Testing     Left Shoulder     Planes of Motion   Flexion: 5   Abduction: 5   External rotation at 0°: 5   Internal rotation at 0°: 5     Right Shoulder     Planes of Motion   Flexion: 4-   Abduction: 4   External rotation at 0°: 4   Internal rotation at 0°: 4+     Tests     Left Shoulder   Positive empty can and lift-off.   Negative belly press and full can.           IMAGING  CT SHOULDER RIGHT 9/10/2024       IMPRESSION:  Impression:  1.No evidence for displaced fracture or dislocation.  2.Evidence of chronic full-thickness rotator cuff tear involving the entirety of the supraspinatus component.  3.Mild to moderate osteoarthritic changes of the glenohumeral joint are noted. There is associated retroversion of the glenoid. The angle of version measures 21 degrees.  4.Moderate to severe atrophy throughout the rotator cuff and rotator cuff  musculature.      See Exercise, Manual, and Modality Logs for complete treatment    Issued, instructed in & performed home exercise program below:   Access Code: [email or web address]  URL: [email or web address]  Date: 03/11/2025  Prepared by: Saul Simmons    Exercises  - Supine Shoulder Flexion Extension AAROM with Dowel  - 2 x daily - 7 x weekly - 1 sets - 10 reps  - Supine Shoulder Abduction AAROM with Dowel  - 2 x daily - 7 x weekly - 1 sets - 10 reps  - Standing Isometric Shoulder Flexion with Doorway - Arm Bent  - 1 x daily - 7 x weekly - 1 sets - 10 reps - 5 sec hold  - Standing Isometric Shoulder Abduction with Doorway - Arm Bent  - 1 x daily - 7 x weekly - 1 sets - 10 reps - 5 sec hold  - Standing Isometric Shoulder Extension with Doorway - Arm Bent  - 1 x daily - 7 x weekly - 1 sets - 10 reps - 5 sec hold  - Standing Isometric Shoulder Internal Rotation with Towel Roll at Doorway  - 1 x daily - 7 x weekly - 1 sets - 10 reps - 5 sec hold  - Standing Isometric Shoulder External Rotation with Doorway and Towel Roll  - 1 x daily - 7 x weekly - 1 sets - 10 reps - 5 sec hold      Assessment & Plan       Assessment  Impairments: abnormal or restricted ROM, impaired physical strength, lacks appropriate home exercise program and pain with function   Functional limitations: carrying objects, lifting, pulling, pushing, uncomfortable because of pain, reaching behind back, reaching overhead and unable to perform repetitive tasks   Assessment details: 84 year old female who presents with the impairments noted above secondary to right shoulder pain, decreased range of motion, decreased strength, and decreased functional mobility..  These impairments decrease her ability and tolerance to perform her normal daily activities.  This patient presents with a level of complexity and their condition is such that the services required can be safely and effectively performed only by a qualified PT or supervised PTA.     Prognosis:  fair  Prognosis details: Patient with right shoulder massive cuff tear with pseudoparalysis which impair her ability to improve with PT    Goals  Plan Goals: STG (6 weeks)  1) Independent in home program for basic shoulder range of motion  2) Demonstrates basic understanding of condition and their role in treatment progression  3) Tolerates initiation of shoulder strengthening  4) Demonstrates slight improvement in tolerance to daily activities as evidenced by QuickDash score of 45% or less.      LTG (12 Weeks)  1) Independent in home program for self-management of his condition  2) Demonstrates AROM  right shoulder flexion and abduction equal to 150 degrees or greater to allow for use of that upper extremity for reaching at or above head-height  3) Demonstrates AROM right shoulder internal and external rotation at 90 degrees abduction equal to 50 degrees or greater to allow for use of that upper extremity for dressing and grooming  4) Demonstrates significant improvement in her tolerance to daily activities as evidenced by QuickDash score of 30% or less.  5) MMT of right shoulder flexion/abduction/IR/ER equal to 4+/5 or greater to allow for ability to safely use that upper extremity for lifting small household and work objects.  6) Reports ability to return doing volunteer work at the [employer] and gardening without difficulty or significant shoulder pain.      Plan  Planned modality interventions: cryotherapy, thermotherapy (hydrocollator packs), ultrasound, high voltage pulsed current (spasm management), high voltage pulsed current (pain management), microcurrent electrical stimulation and TENS  Planned therapy interventions: body mechanics training, flexibility, functional ROM exercises, home exercise program, IADL retraining, joint mobilization, manual therapy, neuromuscular re-education, postural training, soft tissue mobilization, strengthening, stretching and therapeutic activities  Frequency: 2x  week  Duration in weeks: 12  Treatment plan discussed with: patient        History # of Personal Factors and/or Comorbidities: LOW (0)  Examination of Body System(s): # of elements: LOW (1-2)  Clinical Presentation: STABLE   Clinical Decision Making: LOW     Timed:         Manual Therapy:         mins  96990;     Therapeutic Exercise:    20     mins  95609;     Neuromuscular Sterling:        mins  16407;    Therapeutic Activity:     5     mins  87428;     Gait Training:           mins  42938;     Ultrasound:          mins  65545;    Ionto                                   mins   31395  Self Care                            mins   71937      Un-Timed:  Electrical Stimulation:         mins  28082 ( );  Canalith Repos         mins 76995  Traction          mins 52925  Dry Needle                 ______ mins DRYNDL  Low Eval     30     Mins  44764  Mod Eval          Mins  44074  High Eval                            Mins  11749  Re-Eval                               mins  24925        Timed Treatment:   25   mins   Total Treatment:     55   mins    PT SIGNATURE: Raffi Simmons PT   DATE TREATMENT INITIATED: 3/11/2025    Initial Certification  Certification Period: 3/11/2025 through 6/9/2025  I certify that the therapy services are furnished while this patient is under my care.  The services outlined above are required by this patient, and will be reviewed every 90 days.     PHYSICIAN: Adilson Dewey MD      DATE:     Please sign and return via fax to [phone]. Thank you, [facility] Physical Therapy.

## 2025-03-17 ENCOUNTER — TREATMENT (OUTPATIENT)
Dept: PHYSICAL THERAPY | Facility: CLINIC | Age: 85
End: 2025-03-17
Payer: MEDICARE

## 2025-03-17 DIAGNOSIS — M25.511 CHRONIC RIGHT SHOULDER PAIN: ICD-10-CM

## 2025-03-17 DIAGNOSIS — G89.29 CHRONIC RIGHT SHOULDER PAIN: ICD-10-CM

## 2025-03-17 DIAGNOSIS — R29.898 WEAKNESS OF RIGHT SHOULDER: ICD-10-CM

## 2025-03-17 DIAGNOSIS — M25.611 DECREASED RIGHT SHOULDER RANGE OF MOTION: ICD-10-CM

## 2025-03-17 DIAGNOSIS — M19.011 OSTEOARTHRITIS OF RIGHT GLENOHUMERAL JOINT: Primary | ICD-10-CM

## 2025-03-17 PROCEDURE — 97110 THERAPEUTIC EXERCISES: CPT | Performed by: PHYSICAL THERAPIST

## 2025-03-17 PROCEDURE — 97112 NEUROMUSCULAR REEDUCATION: CPT | Performed by: PHYSICAL THERAPIST

## 2025-03-17 NOTE — PROGRESS NOTES
Physical Therapy Treatment Note  [facility] [place]  [address]  [place] [address]    Patient: Nelida Morales   : 1940  Referring practitioner: Adilson Dewey, *  Date of Initial Visit: Type: THERAPY  Noted: 3/11/2025  Today's Date: 3/17/2025  Patient seen for 2 sessions    Diagnosis/ICD-10 Codes:    ICD-10-CM ICD-9-CM   1. Osteoarthritis of right glenohumeral joint  M19.011 715.91   2. Chronic right shoulder pain  M25.511 719.41    G89.29 338.29   3. Weakness of right shoulder  R29.898 781.99   4. Decreased right shoulder range of motion  M25.611 719.51       [employer] Code: [email or web address]         Subjective     Nelida Morales reports: She is maybe slightly better.  She did well with home exercises.    Objective   See Exercise, Manual, and Modality Logs for complete treatment.     Moist heat to right shoulder at start of session    Therapeutic exercises and neuromuscular re-education to improve range of motion; improve muscle strength; restore normal movement patterns and flexibility;  decrease pain; improve balance and coordination; improve ability to move efficiently and maintain stability; and improve soft tissue tone and elasticity    Progressed / advanced exercises as noted in flow sheets; added wall walks    Issued, instructed in & performed home exercise program below:   Access Code: [email or web address]  URL: [email or web address]  Date: 2025  Prepared by: Saul Simmons    Exercises  - Supine Shoulder Flexion Extension AAROM with Dowel  - 2 x daily - 7 x weekly - 1 sets - 10 reps  - Supine Shoulder Abduction AAROM with Dowel  - 2 x daily - 7 x weekly - 1 sets - 10 reps  - Standing Isometric Shoulder Flexion with Doorway - Arm Bent  - 1 x daily - 7 x weekly - 1 sets - 10 reps - 5 sec hold  - Standing Isometric Shoulder Abduction with Doorway - Arm Bent  - 1 x daily - 7 x weekly - 1 sets - 10 reps - 5 sec hold  - Standing Isometric Shoulder Extension with Doorway - Arm Bent  - 1  x daily - 7 x weekly - 1 sets - 10 reps - 5 sec hold  - Standing Isometric Shoulder Internal Rotation with Towel Roll at Doorway  - 1 x daily - 7 x weekly - 1 sets - 10 reps - 5 sec hold  - Standing Isometric Shoulder External Rotation with Doorway and Towel Roll  - 1 x daily - 7 x weekly - 1 sets - 10 reps - 5 sec hold  - Standing Shoulder Flexion Wall Walk  - 1 x daily - 7 x weekly - 1 sets - 10 reps  - Standing Shoulder Scaption Wall Walk  - 1 x daily - 7 x weekly - 1 sets - 10 reps  - Shoulder Alphabet with Ball at Counter  - 1 x daily - 7 x weekly - 1 sets - 1 reps    Assessment/Plan  Minimal to no change as yet but is tolerating interventions in clinic and home program well.      Progress per Plan of Care and Progress strengthening /stabilization /functional activity as tolerated.  Assess response to today's interventions.  Assess response to home exercise program.           Manual Therapy:         mins  05574;  Therapeutic Exercise:    20     mins  88175;     Neuromuscular Sterling:   10     mins  85898;    Therapeutic Activity:          mins  54711;     Gait Training:           mins  72270;     Ultrasound:          mins  02091;    Electrical Stimulation:         mins  72421 ( );  Dry Needling          mins self-pay    Timed Treatment:   30   mins   Total Treatment:     30   mins    Raffi Simmons PT  Physical Therapist

## 2025-03-24 ENCOUNTER — TREATMENT (OUTPATIENT)
Dept: PHYSICAL THERAPY | Facility: CLINIC | Age: 85
End: 2025-03-24
Payer: MEDICARE

## 2025-03-24 DIAGNOSIS — M25.511 CHRONIC RIGHT SHOULDER PAIN: ICD-10-CM

## 2025-03-24 DIAGNOSIS — R29.898 WEAKNESS OF RIGHT SHOULDER: ICD-10-CM

## 2025-03-24 DIAGNOSIS — M19.011 OSTEOARTHRITIS OF RIGHT GLENOHUMERAL JOINT: Primary | ICD-10-CM

## 2025-03-24 DIAGNOSIS — G89.29 CHRONIC RIGHT SHOULDER PAIN: ICD-10-CM

## 2025-03-24 PROCEDURE — 97112 NEUROMUSCULAR REEDUCATION: CPT | Performed by: PHYSICAL THERAPIST

## 2025-03-24 PROCEDURE — 97110 THERAPEUTIC EXERCISES: CPT | Performed by: PHYSICAL THERAPIST

## 2025-03-24 PROCEDURE — 97140 MANUAL THERAPY 1/> REGIONS: CPT | Performed by: PHYSICAL THERAPIST

## 2025-03-24 NOTE — PROGRESS NOTES
Physical Therapy Treatment Note  [facility] [place]  [address]  [place] [address]    Patient: Nelida Morales   : 1940  Referring practitioner: Adilson Dewey, *  Date of Initial Visit: Type: THERAPY  Noted: 3/11/2025  Today's Date: 3/24/2025  Patient seen for 3 sessions    Diagnosis/ICD-10 Codes:    ICD-10-CM ICD-9-CM   1. Osteoarthritis of right glenohumeral joint  M19.011 715.91   2. Chronic right shoulder pain  M25.511 719.41    G89.29 338.29   3. Weakness of right shoulder  R29.898 781.99       [employer] Code: [email or web address]         Subjective     Nelida Morales reports: She is doing OK.  Has not substantially improved as of yet.  She does note some pain in her left neck in area of upper trapezius.     Objective   See Exercise, Manual, and Modality Logs for complete treatment.     Therapeutic exercises and neuromuscular re-education to improve range of motion; improve muscle strength; restore normal movement patterns and flexibility;  decrease pain; improve balance and coordination; improve ability to move efficiently and maintain stability; and improve soft tissue tone and elasticity    Some pain and tightness noted in R AC joint so initiated mobilizations.    Pain and tightness in R upper trap, so initiated gentle stretching.    Progressed / advanced exercises as noted in flow sheets; added posture correction exercises.    Issued, instructed in & performed home exercise program below:   Access Code: [email or web address]  URL: [email or web address]  Date: 2025  Prepared by: Saul Simmons    Exercises  - Seated Gentle Upper Trapezius Stretch  - 1 x daily - 7 x weekly - 1 sets - 3 reps - 20 sec hold  - Seated Scapular Retraction  - 1 x daily - 7 x weekly - 1 sets - 10 reps - 5 sec hold  - Seated Cervical Retraction  - 1 x daily - 7 x weekly - 1 sets - 10 reps - 5 sec hold    Assessment/Plan  Minimal to no change as yet but is tolerating interventions in clinic and home program well.   Does have a lot of muscle tension in right upper trap.      Progress per Plan of Care and Progress strengthening /stabilization /functional activity as tolerated.  Assess response to today's interventions.  Assess response to home exercise program.           Manual Therapy:     6    mins  33095;  Therapeutic Exercise:    20     mins  68015;     Neuromuscular Sterling:   10     mins  53156;    Therapeutic Activity:          mins  71182;     Gait Training:           mins  09691;     Ultrasound:          mins  38759;    Electrical Stimulation:         mins  34059 ( );  Dry Needling          mins self-pay    Timed Treatment:   38   mins   Total Treatment:     38   mins    Raffi Simmons PT  Physical Therapist

## 2025-04-01 ENCOUNTER — TREATMENT (OUTPATIENT)
Dept: PHYSICAL THERAPY | Facility: CLINIC | Age: 85
End: 2025-04-01
Payer: MEDICARE

## 2025-04-01 DIAGNOSIS — R29.898 WEAKNESS OF RIGHT SHOULDER: ICD-10-CM

## 2025-04-01 DIAGNOSIS — M25.611 DECREASED RIGHT SHOULDER RANGE OF MOTION: ICD-10-CM

## 2025-04-01 DIAGNOSIS — G89.29 CHRONIC RIGHT SHOULDER PAIN: ICD-10-CM

## 2025-04-01 DIAGNOSIS — M25.511 CHRONIC RIGHT SHOULDER PAIN: ICD-10-CM

## 2025-04-01 DIAGNOSIS — M19.011 OSTEOARTHRITIS OF RIGHT GLENOHUMERAL JOINT: Primary | ICD-10-CM

## 2025-04-01 PROCEDURE — 97112 NEUROMUSCULAR REEDUCATION: CPT | Performed by: PHYSICAL THERAPIST

## 2025-04-01 PROCEDURE — 97110 THERAPEUTIC EXERCISES: CPT | Performed by: PHYSICAL THERAPIST

## 2025-04-01 PROCEDURE — 97140 MANUAL THERAPY 1/> REGIONS: CPT | Performed by: PHYSICAL THERAPIST

## 2025-04-01 NOTE — PROGRESS NOTES
Physical Therapy Treatment Note  [facility] [place]  [address]  [place] [address]    Patient: Nelida Morales   : 1940  Referring practitioner: Adilson Dewey, *  Date of Initial Visit: Type: THERAPY  Noted: 3/11/2025  Today's Date: 2025  Patient seen for 4 sessions    Diagnosis/ICD-10 Codes:    ICD-10-CM ICD-9-CM   1. Osteoarthritis of right glenohumeral joint  M19.011 715.91   2. Chronic right shoulder pain  M25.511 719.41    G89.29 338.29   3. Weakness of right shoulder  R29.898 781.99   4. Decreased right shoulder range of motion  M25.611 719.51       [employer] Code: [email or web address]         Subjective     Nelida Morales reports: She  reports some pain in right upper trapezius.with stretching, so she had to stop that exercise.    Objective   See Exercise, Manual, and Modality Logs for complete treatment.     Therapeutic exercises and neuromuscular re-education to improve range of motion; improve muscle strength; restore normal movement patterns and flexibility;  decrease pain; improve balance and coordination; improve ability to move efficiently and maintain stability; and improve soft tissue tone and elasticity    Pain and tightness noted in R AC joint so continued  initiated mobilizations.    Pain and tightness in R upper trap, so initiated gentle stretching.    Progressed / advanced exercises as noted in flow sheets;    Reviewed & performed home exercise program below:   Access Code: [email or web address]  URL: [email or web address]  Date: 2025  Prepared by: Saul Simmons    Exercises  - Seated Gentle Upper Trapezius Stretch  - 1 x daily - 7 x weekly - 1 sets - 3 reps - 20 sec hold  - Seated Scapular Retraction  - 1 x daily - 7 x weekly - 1 sets - 10 reps - 5 sec hold  - Seated Cervical Retraction  - 1 x daily - 7 x weekly - 1 sets - 10 reps - 5 sec hold    Assessment/Plan  Minimal to no change as yet but is tolerating interventions in clinic and home program well.  Does have a  lot of muscle tension in right upper trap.      Progress per Plan of Care and Progress strengthening /stabilization /functional activity as tolerated.  Assess response to today's interventions.  Assess response to home exercise program.           Manual Therapy:     6    mins  69959;  Therapeutic Exercise:    20     mins  54120;     Neuromuscular Sterling:   10     mins  39145;    Therapeutic Activity:          mins  51840;     Gait Training:           mins  68850;     Ultrasound:          mins  41294;    Electrical Stimulation:         mins  33931 ( );  Dry Needling          mins self-pay    Timed Treatment:   38   mins   Total Treatment:     38   mins    Raffi Simmons PT  Physical Therapist

## 2025-04-15 ENCOUNTER — TREATMENT (OUTPATIENT)
Dept: PHYSICAL THERAPY | Facility: CLINIC | Age: 85
End: 2025-04-15
Payer: MEDICARE

## 2025-04-15 DIAGNOSIS — M25.511 CHRONIC RIGHT SHOULDER PAIN: ICD-10-CM

## 2025-04-15 DIAGNOSIS — G89.29 CHRONIC RIGHT SHOULDER PAIN: ICD-10-CM

## 2025-04-15 DIAGNOSIS — R29.898 WEAKNESS OF RIGHT SHOULDER: ICD-10-CM

## 2025-04-15 DIAGNOSIS — M19.011 OSTEOARTHRITIS OF RIGHT GLENOHUMERAL JOINT: Primary | ICD-10-CM

## 2025-04-15 DIAGNOSIS — M25.611 DECREASED RIGHT SHOULDER RANGE OF MOTION: ICD-10-CM

## 2025-04-15 PROCEDURE — 97110 THERAPEUTIC EXERCISES: CPT | Performed by: PHYSICAL THERAPIST

## 2025-04-15 PROCEDURE — 97112 NEUROMUSCULAR REEDUCATION: CPT | Performed by: PHYSICAL THERAPIST

## 2025-04-15 PROCEDURE — 97530 THERAPEUTIC ACTIVITIES: CPT | Performed by: PHYSICAL THERAPIST

## 2025-04-15 NOTE — PROGRESS NOTES
Re-Assessment / Progress Note  [facility] [place]  [address]  [place] [address]  Patient: Nelida Morales   : 1940  Referring practitioner: Adilson Dewey, *  Date of Initial Visit: Episode Type: THERAPY  Noted: 3/11/2025    Today's Date: 4/15/2025  Patient seen for 5 sessions.    Diagnosis/ICD-10 Codes:    ICD-10-CM ICD-9-CM   1. Osteoarthritis of right glenohumeral joint  M19.011 715.91   2. Chronic right shoulder pain  M25.511 719.41    G89.29 338.29   3. Weakness of right shoulder  R29.898 781.99   4. Decreased right shoulder range of motion  M25.611 719.51         Subjective:     Subjective Questionnaire: QuickDASH: 32% impairment (59% at initial evaluation)   Clinical Progress: minimal improvement  Home Program Compliance: Yes  Treatment has included: therapeutic exercise, neuromuscular re-education, therapeutic activity, moist heat, and cryotherapy    Subjective     Nelida Morales reports: She is having a lot pain in her right upper trap and in the front of her right shoulder.  She feel like some of the exercises are aggravating her right upper trap.  She has a little better movement, but not much.      Pain  Current pain rating: 3  At best pain ratin  At worst pain ratin-5  Location: right anterior shoulder and upper trap  Quality: dull ache (more sharp when raising arm)  Relieving factors: rest, heat   Exacerbated by: reaching up, reachout out, reaching back, lying on right shoulder.  Progression: slightly worsening    Objective           Cervical/Thoracic Screen   Cervical range of motion within normal limits  Cervical range of motion within normal limits with the following exceptions: Some discomfort with right rotation and left side bending  Thoracic range of motion within normal limits     Active Range of Motion   Left Shoulder   Flexion: 170 degrees   Abduction: 170 degrees   External rotation 90°: 90 degrees   Internal rotation 90°: 70 degrees      Right Shoulder    Flexion: 75 degrees   Abduction: 60 degrees   External rotation 90°: 50 degrees  Internal rotation 90°: 30 degrees      Passive Range of Motion      Right Shoulder   Flexion: 160 degrees   Abduction: 160 degrees   External rotation 90°: 70 degrees   Internal rotation 90°: 40 degrees      Scapular Mobility      Right Shoulder   Scapular mobility: fair  Scapular Mobility with Shoulder to 90° FF   Upward rotation: inadequate     Strength/Myotome Testing      Left Shoulder      Planes of Motion   Flexion: 5   Abduction: 5   External rotation at 0°: 5   Internal rotation at 0°: 5      Right Shoulder      Planes of Motion   Flexion: 4-   Abduction: 4   External rotation at 0°: 4   Internal rotation at 0°: 4+      Tests      Left Shoulder   Positive empty can and lift-off.   Negative belly press and full can.          Assessment/Plan    Nelida Morales has shown a little improvement in her shoulder movement,  but is having more pain.  She still demonstrates positive empty can and lift-off tests and has notable weakness and decreased range of motion of her right shoulder.  Her tolerance to exercises for shoulder strengthening and stabilization is limited.   Given her mild improvement in shoulder motion, she may benefit from a few more PT visits,  but if no significant response she may need orthopedic follow up.    Progress toward previous goals: Partially Met    Goals    Short-term goals (STG): 4/4  Long-term goals (LTG): 0/6      STG (6 weeks)  1) Independent in home program for basic shoulder range of motion (MET)   2) Demonstrates basic understanding of condition and their role in treatment progression (MET)   3) Tolerates initiation of shoulder strengthening (MET)   4) Demonstrates slight improvement in tolerance to daily activities as evidenced by QuickDash score of 45% or less. (MET)         LTG (12 Weeks)  1) Independent in home program for self-management of her condition (NOT MET)   2) Demonstrates AROM  right  shoulder flexion and abduction equal to 150 degrees or greater to allow for use of that upper extremity for reaching at or above head-height (NOT MET)   3) Demonstrates AROM right shoulder internal and external rotation at 90 degrees abduction equal to 50 degrees or greater to allow for use of that upper extremity for dressing and grooming (NOT MET)   4) Demonstrates significant improvement in her tolerance to daily activities as evidenced by QuickDash score of 30% or less.(NOT MET)   5) MMT of right shoulder flexion/abduction/IR/ER equal to 4+/5 or greater to allow for ability to safely use that upper extremity for lifting small household and work objects. 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 or significant shoulder pain (NOT MET)     Recommendations: Continue as planned  Timeframe: 3 weeks  Prognosis to achieve goals:  poor to fair    PT Signature: Raffi Simmons, PT        Manual Therapy:                 mins  52409;  Therapeutic Exercise:    20     mins  88494;     Neuromuscular Sterling:   10     mins  72836;    Therapeutic Activity:       10    mins  16232;     Gait Training:                      mins  79590;     Ultrasound:                          mins  10892;    Electrical Stimulation:         mins  51647 ( );  Dry Needling                       mins self-pay     Timed Treatment:   40   mins   Total Treatment:     40   min

## 2025-04-15 NOTE — LETTER
Re-Assessment / Progress Note  [facility] [place]  [address]  [place] [address]  Patient: Nelida Morales   : 1940  Referring practitioner: Adilson Dewey, *  Date of Initial Visit: Episode Type: THERAPY  Noted: 3/11/2025    Today's Date: 4/15/2025  Patient seen for 5 sessions.    Diagnosis/ICD-10 Codes:    ICD-10-CM ICD-9-CM   1. Osteoarthritis of right glenohumeral joint  M19.011 715.91   2. Chronic right shoulder pain  M25.511 719.41    G89.29 338.29   3. Weakness of right shoulder  R29.898 781.99   4. Decreased right shoulder range of motion  M25.611 719.51         Subjective:     Subjective Questionnaire: QuickDASH: 32% impairment (59% at initial evaluation)   Clinical Progress: minimal improvement  Home Program Compliance: Yes  Treatment has included: therapeutic exercise, neuromuscular re-education, therapeutic activity, moist heat, and cryotherapy    Subjective     Nelida Morales reports: She is having a lot pain in her right upper trap and in the front of her right shoulder.  She feel like some of the exercises are aggravating her right upper trap.  She has a little better movement, but not much.      Pain  Current pain rating: 3  At best pain ratin  At worst pain ratin-5  Location: right anterior shoulder and upper trap  Quality: dull ache (more sharp when raising arm)  Relieving factors: rest, heat   Exacerbated by: reaching up, reachout out, reaching back, lying on right shoulder.  Progression: slightly worsening    Objective           Cervical/Thoracic Screen   Cervical range of motion within normal limits  Cervical range of motion within normal limits with the following exceptions: Some discomfort with right rotation and left side bending  Thoracic range of motion within normal limits     Active Range of Motion   Left Shoulder   Flexion: 170 degrees   Abduction: 170 degrees   External rotation 90°: 90 degrees   Internal rotation 90°: 70 degrees      Right Shoulder    Flexion: 75 degrees   Abduction: 60 degrees   External rotation 90°: 50 degrees  Internal rotation 90°: 30 degrees      Passive Range of Motion      Right Shoulder   Flexion: 160 degrees   Abduction: 160 degrees   External rotation 90°: 70 degrees   Internal rotation 90°: 40 degrees      Scapular Mobility      Right Shoulder   Scapular mobility: fair  Scapular Mobility with Shoulder to 90° FF   Upward rotation: inadequate     Strength/Myotome Testing      Left Shoulder      Planes of Motion   Flexion: 5   Abduction: 5   External rotation at 0°: 5   Internal rotation at 0°: 5      Right Shoulder      Planes of Motion   Flexion: 4-   Abduction: 4   External rotation at 0°: 4   Internal rotation at 0°: 4+      Tests      Left Shoulder   Positive empty can and lift-off.   Negative belly press and full can.          Assessment/Plan    Nelida oMrales has shown a little improvement in her shoulder movement,  but is having more pain.  She still demonstrates positive empty can and lift-off tests and has notable weakness and decreased range of motion of her right shoulder.  Her tolerance to exercises for shoulder strengthening and stabilization is limited.   Given her mild improvement in shoulder motion, she may benefit from a few more PT visits,  but if no significant response she may need orthopedic follow up.    Progress toward previous goals: Partially Met    Goals    Short-term goals (STG): 4/4  Long-term goals (LTG): 0/6      STG (6 weeks)  1) Independent in home program for basic shoulder range of motion (MET)   2) Demonstrates basic understanding of condition and their role in treatment progression (MET)   3) Tolerates initiation of shoulder strengthening (MET)   4) Demonstrates slight improvement in tolerance to daily activities as evidenced by QuickDash score of 45% or less. (MET)         LTG (12 Weeks)  1) Independent in home program for self-management of her condition (NOT MET)   2) Demonstrates AROM  right  shoulder flexion and abduction equal to 150 degrees or greater to allow for use of that upper extremity for reaching at or above head-height (NOT MET)   3) Demonstrates AROM right shoulder internal and external rotation at 90 degrees abduction equal to 50 degrees or greater to allow for use of that upper extremity for dressing and grooming (NOT MET)   4) Demonstrates significant improvement in her tolerance to daily activities as evidenced by QuickDash score of 30% or less.(NOT MET)   5) MMT of right shoulder flexion/abduction/IR/ER equal to 4+/5 or greater to allow for ability to safely use that upper extremity for lifting small household and work objects. 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

## 2025-04-23 ENCOUNTER — TREATMENT (OUTPATIENT)
Dept: PHYSICAL THERAPY | Facility: CLINIC | Age: 85
End: 2025-04-23
Payer: MEDICARE

## 2025-04-23 DIAGNOSIS — R29.898 WEAKNESS OF RIGHT SHOULDER: ICD-10-CM

## 2025-04-23 DIAGNOSIS — M25.511 CHRONIC RIGHT SHOULDER PAIN: ICD-10-CM

## 2025-04-23 DIAGNOSIS — G89.29 CHRONIC RIGHT SHOULDER PAIN: ICD-10-CM

## 2025-04-23 DIAGNOSIS — M19.011 OSTEOARTHRITIS OF RIGHT GLENOHUMERAL JOINT: Primary | ICD-10-CM

## 2025-04-23 DIAGNOSIS — M25.611 DECREASED RIGHT SHOULDER RANGE OF MOTION: ICD-10-CM

## 2025-04-23 PROCEDURE — 97110 THERAPEUTIC EXERCISES: CPT | Performed by: PHYSICAL THERAPIST

## 2025-04-23 PROCEDURE — 97112 NEUROMUSCULAR REEDUCATION: CPT | Performed by: PHYSICAL THERAPIST

## 2025-04-23 NOTE — PROGRESS NOTES
Physical Therapy Treatment Note  [facility] [place]  [address]  [place] [address]    Patient: Nelida Morales   : 1940  Referring practitioner: Adilson Dewey, *  Date of Initial Visit: Type: THERAPY  Noted: 3/11/2025  Today's Date: 2025  Patient seen for 6 sessions    Diagnosis/ICD-10 Codes:    ICD-10-CM ICD-9-CM   1. Osteoarthritis of right glenohumeral joint  M19.011 715.91   2. Chronic right shoulder pain  M25.511 719.41    G89.29 338.29   3. Weakness of right shoulder  R29.898 781.99   4. Decreased right shoulder range of motion  M25.611 719.51       [place] Code: [email or web address]         Subjective     Nelida Morales reports: She continues to note pain in right upper trapezius.with stretching.  Shoulder still sore and painful and has limited tolerance to exercises.  She would like to hold PT pending ortho visit on .    Objective   See Exercise, Manual, and Modality Logs for complete treatment.     Therapeutic exercises and neuromuscular re-education to improve range of motion; improve muscle strength; restore normal movement patterns and flexibility;  decrease pain; improve balance and coordination; improve ability to move efficiently and maintain stability; and improve soft tissue tone and elasticity    Held R AC joint mobilizations.    Gentle R upper trap stretching    Progressed / advanced exercises as noted in flow sheets;    Reviewed & performed home exercise program below:   Access Code: [email or web address]  URL: [email or web address]  Date: 2025  Prepared by: Saul Simmons    Exercises  - Seated Gentle Upper Trapezius Stretch  - 1 x daily - 7 x weekly - 1 sets - 3 reps - 20 sec hold  - Seated Scapular Retraction  - 1 x daily - 7 x weekly - 1 sets - 10 reps - 5 sec hold  - Seated Cervical Retraction  - 1 x daily - 7 x weekly - 1 sets - 10 reps - 5 sec hold    Assessment/Plan  Minimal to no change as yet and limited tolerance to interventions in clinic and home  program.  Continues to have a lot of muscle tension in right upper trap.    Hold PT pending MD visit.           Manual Therapy:         mins  19272;  Therapeutic Exercise:    20     mins  14876;     Neuromuscular Sterling:   10     mins  98389;    Therapeutic Activity:          mins  54729;     Gait Training:           mins  38668;     Ultrasound:          mins  17398;    Electrical Stimulation:         mins  20302 ( );  Dry Needling          mins self-pay    Timed Treatment:   30   mins   Total Treatment:     30   mins    Raffi Simmons PT  Physical Therapist

## 2025-05-08 ENCOUNTER — OFFICE VISIT (OUTPATIENT)
Dept: ORTHOPEDIC SURGERY | Facility: CLINIC | Age: 85
End: 2025-05-08
Payer: MEDICARE

## 2025-05-08 VITALS — WEIGHT: 141 LBS | HEIGHT: 62 IN | HEART RATE: 78 BPM | BODY MASS INDEX: 25.95 KG/M2

## 2025-05-08 DIAGNOSIS — M19.011 OSTEOARTHRITIS OF RIGHT GLENOHUMERAL JOINT: Primary | ICD-10-CM

## 2025-05-08 RX ORDER — TRIAMCINOLONE ACETONIDE 40 MG/ML
80 INJECTION, SUSPENSION INTRA-ARTICULAR; INTRAMUSCULAR ONCE
Status: COMPLETED | OUTPATIENT
Start: 2025-05-08 | End: 2025-05-08

## 2025-05-08 RX ADMIN — TRIAMCINOLONE ACETONIDE 80 MG: 40 INJECTION, SUSPENSION INTRA-ARTICULAR; INTRAMUSCULAR at 02:00

## 2025-05-08 NOTE — PROGRESS NOTES
"     Patient ID: Nelida Morales is a 84 y.o. female.  Shoulder pain  Returns with right shoulder pain secondary to massive cuff tear last had a steroid injection in October results pretty good she is curious about further options as she has an ongoing medical workup right now    Review of Systems:        Objective:    Pulse 78   Ht 156.2 cm (61.5\")   Wt 64 kg (141 lb)   BMI 26.21 kg/m²     Physical Examination:      Right shoulder demonstrates intact skin mild pain in the impingement area mild supraspinatus atrophy passive elevation 160 abduction 130 external rotation 40 active elevation demonstrates pseudoparalysis with elevation of 90 degrees intact deltoid function belly press and liftoff are 5/5 and 3/5.  Sensory and motor exam are intact all distributions. Radial pulse is palpable and capillary refill is less than two seconds to all digits    Imaging:       Assessment:    Right shoulder massive cuff tear    Plan:   Further options were discussed we will continue conservative treatment at her request, I recommend injection after today's evaluation. Risks and benefits of the injection were discussed. Under sterile technique and after timeout and verbal consent I injected 80 mg of Kenalog and 2 mL of 1% Lidocaine in the shoulder and subacromial space. It was well tolerated.  See me as needed      Procedures          Disclaimer: Part of this note may be an electronic transcription/translation of spoken language to printed text using the Dragon Dictation System  "